# Patient Record
Sex: FEMALE | Race: WHITE | HISPANIC OR LATINO | Employment: PART TIME | URBAN - METROPOLITAN AREA
[De-identification: names, ages, dates, MRNs, and addresses within clinical notes are randomized per-mention and may not be internally consistent; named-entity substitution may affect disease eponyms.]

---
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
## 2020-02-10 ENCOUNTER — TRANSCRIBE ORDERS (OUTPATIENT)
Dept: ADMINISTRATIVE | Facility: HOSPITAL | Age: 20
End: 2020-02-10

## 2020-02-10 ENCOUNTER — APPOINTMENT (OUTPATIENT)
Dept: LAB | Facility: HOSPITAL | Age: 20
End: 2020-02-10
Attending: PLASTIC SURGERY
Payer: COMMERCIAL

## 2020-02-10 DIAGNOSIS — Z01.812 PRE-OPERATIVE LABORATORY EXAMINATION: ICD-10-CM

## 2020-02-10 DIAGNOSIS — Z01.812 PRE-OPERATIVE LABORATORY EXAMINATION: Primary | ICD-10-CM

## 2020-02-10 LAB
ANISOCYTOSIS BLD QL SMEAR: PRESENT
APTT PPP: 28 SECONDS (ref 25–32)
BASOPHILS # BLD AUTO: 0.1 THOUSANDS/ΜL (ref 0–0.1)
BASOPHILS NFR BLD AUTO: 1 % (ref 0–1)
EOSINOPHIL # BLD AUTO: 0.2 THOUSAND/ΜL (ref 0–0.4)
EOSINOPHIL NFR BLD AUTO: 2 % (ref 0–6)
ERYTHROCYTE [DISTWIDTH] IN BLOOD BY AUTOMATED COUNT: 17.5 %
HCT VFR BLD AUTO: 36 % (ref 36–46)
HGB BLD-MCNC: 11.3 G/DL (ref 12–16)
HYPERCHROMIA BLD QL SMEAR: PRESENT
INR PPP: 0.94 (ref 0.91–1.09)
LG PLATELETS BLD QL SMEAR: PRESENT
LYMPHOCYTES # BLD AUTO: 2.4 THOUSANDS/ΜL (ref 0.5–4)
LYMPHOCYTES NFR BLD AUTO: 30 % (ref 25–45)
MCH RBC QN AUTO: 21.6 PG (ref 26–34)
MCHC RBC AUTO-ENTMCNC: 31.5 G/DL (ref 31–36)
MCV RBC AUTO: 69 FL (ref 80–100)
MICROCYTES BLD QL AUTO: PRESENT
MONOCYTES # BLD AUTO: 0.6 THOUSAND/ΜL (ref 0.2–0.9)
MONOCYTES NFR BLD AUTO: 7 % (ref 1–10)
NEUTROPHILS # BLD AUTO: 4.9 THOUSANDS/ΜL (ref 1.8–7.8)
NEUTS SEG NFR BLD AUTO: 61 % (ref 45–65)
PLATELET # BLD AUTO: 286 THOUSANDS/UL (ref 150–450)
PLATELET BLD QL SMEAR: ADEQUATE
PMV BLD AUTO: 8.3 FL (ref 8.9–12.7)
POIKILOCYTOSIS BLD QL SMEAR: PRESENT
PROTHROMBIN TIME: 10.5 SECONDS (ref 9.8–12)
RBC # BLD AUTO: 5.24 MILLION/UL (ref 4–5.2)
RBC MORPH BLD: NORMAL
WBC # BLD AUTO: 8.1 THOUSAND/UL (ref 4.5–11)

## 2020-02-10 PROCEDURE — 85025 COMPLETE CBC W/AUTO DIFF WBC: CPT

## 2020-02-10 PROCEDURE — 85730 THROMBOPLASTIN TIME PARTIAL: CPT

## 2020-02-10 PROCEDURE — 85610 PROTHROMBIN TIME: CPT

## 2020-02-10 PROCEDURE — 36415 COLL VENOUS BLD VENIPUNCTURE: CPT

## 2020-02-20 NOTE — PRE-PROCEDURE INSTRUCTIONS
Pre-Surgery Instructions:   Medication Instructions    cetirizine (ZyrTEC) 10 mg tablet Instructed patient per Anesthesia Guidelines  Pre-op Showering Instructions for Surgery Patients    Before your operation, you play an important role in decreasing your risk for infection by washing with special antiseptic soap  This is an effective way to reduce bacteria on the skin which may help to prevent infections at the surgical site  Please read the following directions in advance  1  In the week before your operation, purchase a 4 ounce bottle of antiseptic soap containing chlorhexidine gluconate (CHG)  4%  Some brand names include: Aplicare®, Endure, and Hibiclens®  The cost is usually less than $5 00   For your convenience, the [employer] carries the soap   It may also be available at your doctors office or pre-admission testing center, and at most retail pharmacies   If you are allergic or sensitive to soaps containing CHG, please let your doctor know so another antiseptic can be suggested   CHG antiseptic soap is for external use only  2  The day before your operation, follow these instructions carefully to get ready   Please clean linens (sheets) on your bed; you should sleep on clean sheets after your evening shower   Get clean towels and washcloth ready - you need enough for 2 showers   Set aside clean underwear, pajamas, and clothing to wear after the showers     Reminders:   DO NOT use any other soap or body rinse on your skin during or after the antiseptic showers   DO NOT use lotion, powder, deodorant, or perfume/aftershave of any kind on your skin after your antiseptic shower   DO NOT shave any body parts in the 24 hours/day before your operation   DO NOT get the antiseptic soap in your eyes, ears, nose, mouth, or vaginal area    3   You will need to shower the night before AND the morning of your surgery  Shower 1:   The first evening before the operation, take the first shower   First, shampoo your hair with regular shampoo and rinse it completely before you use the antiseptic soap  After washing and rinsing your hair, rinse your body   Next, use a clean washcloth to apply the antiseptic soap and wash your body from the neck down to your toes using ½ bottle of the antiseptic soap   You will use the other ½ bottle for the second shower   Clean the area where your incision will be; lather this area well for about 2 minutes   If you are having head or neck surgery, wash areas with the antiseptic soap   Rinse yourself completely with running water   Use a clean towel to dry off   Wear clean underwear and clothing/pajamas  Shower 2   The morning of your operation, take the second shower following the same steps as Shower 1 using the second ½ of the bottle of antiseptic soap   Use clean cloths and towels to wash and dry yourself   Wear clean underwear and clothing

## 2020-02-21 ENCOUNTER — ANESTHESIA EVENT (OUTPATIENT)
Dept: PERIOP | Facility: HOSPITAL | Age: 20
End: 2020-02-21
Payer: COMMERCIAL

## 2020-02-21 NOTE — H&P
H&P Exam - Elio Salomon 21 y o  female MRN: [id number]    Unit/Bed#:  Encounter: [id number]    Assessment:  Bilateral macromastia    Plan:  Bilateral breast reduction mammoplasty    History of Present Illness   This is a 55-year-old female with bilateral large heavy pendulous breasts that hangs down over her abdomen not supported well on the chest wall  Patient has a chronic intertriginous rash in the inframammary fold  Patient has rounding of her shoulders and notching in her shoulders from the heaviness flow bra straps  Patient has a diagnosis of bilateral macromastia  Review of Systems   All other systems reviewed and are negative  Historical Information   Past Medical History:   Diagnosis Date    Allergic rhinitis     Anemia     Anxiety     Depression     no meds    Medical history non-contributory     Migraines      Past Surgical History:   Procedure Laterality Date    NO PAST SURGERIES       Social History   Social History     Substance and Sexual Activity   Alcohol Use No     Social History     Substance and Sexual Activity   Drug Use No     Social History     Tobacco Use   Smoking Status Never Smoker   Smokeless Tobacco Never Used     Family History: non-contributory    Meds/Allergies   all medications and allergies reviewed  Allergies   Allergen Reactions    Pollen Extract        Objective   First Vitals:        Current Vitals:        No intake or output data in the 24 hours ending 02/21/20 1731    Invasive Devices     None                 Physical Exam examination of the head ears eyes nose and throat is within normal limits heart sounds S1-S2 heard no murmurs or gallops lungs clear abdomen soft extremities are within normal limits the breast of bilateral large heavy and pendulous  Hang down over the abdomen  There is a chronic intertriginous rash in the inframammary fold  The right breast is bigger than the left      Lab Results:   Imaging:   EKG, Pathology, and Other Studies:     Code Status: No Order  Advance Directive and Living Will:      Power of :    POLST:      Counseling / Coordination of Care:   None

## 2020-02-21 NOTE — ANESTHESIA PREPROCEDURE EVALUATION
Review of Systems/Medical History  Patient summary reviewed  Chart reviewed  No history of anesthetic complications     Cardiovascular  Exercise tolerance (METS): >4,     Pulmonary  Not a smoker , No asthma , No sleep apnea ,        GI/Hepatic  Negative GI/hepatic ROS          Negative  ROS        Endo/Other    Obesity    GYN  Not currently pregnant , Prior pregnancy/OB history : 0 Parity: 0,          Hematology  Anemia iron deficiency anemia,     Musculoskeletal  Negative musculoskeletal ROS Back pain , lumbar pain,        Neurology    Headaches,    Psychology   Anxiety, Depression , being treated for depression,              Physical Exam    Airway    Mallampati score: II  TM Distance: >3 FB  Neck ROM: full     Dental       Cardiovascular  Cardiovascular exam normal    Pulmonary  Pulmonary exam normal     Other Findings  Fixed upper and lower teeth and in good repair      Anesthesia Plan  ASA Score- 2     Anesthesia Type- general with ASA Monitors  Additional Monitors:   Airway Plan: ETT  Plan Factors-Patient not instructed to abstain from smoking on day of procedure  Patient did not smoke on day of surgery  Induction- intravenous  Postoperative Plan- Plan for postoperative opioid use  Informed Consent- Anesthetic plan and risks discussed with patient, mother and sibling  I personally reviewed this patient with the CRNA  Discussed and agreed on the Anesthesia Plan with the CRNA  Greg Yen

## 2020-02-24 ENCOUNTER — ANESTHESIA (OUTPATIENT)
Dept: PERIOP | Facility: HOSPITAL | Age: 20
End: 2020-02-24
Payer: COMMERCIAL

## 2020-02-24 ENCOUNTER — HOSPITAL ENCOUNTER (OUTPATIENT)
Facility: HOSPITAL | Age: 20
Setting detail: OUTPATIENT SURGERY
Discharge: HOME | End: 2020-02-25
Attending: PLASTIC SURGERY | Admitting: PLASTIC SURGERY
Payer: COMMERCIAL

## 2020-02-24 DIAGNOSIS — N62 HYPERTROPHY OF BREAST: Primary | ICD-10-CM

## 2020-02-24 LAB
EXT PREGNANCY TEST URINE: NEGATIVE
EXT. CONTROL: NORMAL

## 2020-02-24 PROCEDURE — 88305 TISSUE EXAM BY PATHOLOGIST: CPT | Performed by: PATHOLOGY

## 2020-02-24 PROCEDURE — 81025 URINE PREGNANCY TEST: CPT | Performed by: ANESTHESIOLOGY

## 2020-02-24 RX ORDER — TRAMADOL HYDROCHLORIDE 50 MG/1
50 TABLET ORAL EVERY 6 HOURS PRN
Status: DISCONTINUED | OUTPATIENT
Start: 2020-02-24 | End: 2020-02-25 | Stop reason: HOSPADM

## 2020-02-24 RX ORDER — PROMETHAZINE HYDROCHLORIDE 25 MG/ML
12.5 INJECTION, SOLUTION INTRAMUSCULAR; INTRAVENOUS ONCE AS NEEDED
Status: COMPLETED | OUTPATIENT
Start: 2020-02-24 | End: 2020-02-24

## 2020-02-24 RX ORDER — ROCURONIUM BROMIDE 10 MG/ML
INJECTION, SOLUTION INTRAVENOUS AS NEEDED
Status: DISCONTINUED | OUTPATIENT
Start: 2020-02-24 | End: 2020-02-24 | Stop reason: SURG

## 2020-02-24 RX ORDER — NEOSTIGMINE METHYLSULFATE 1 MG/ML
INJECTION INTRAVENOUS AS NEEDED
Status: DISCONTINUED | OUTPATIENT
Start: 2020-02-24 | End: 2020-02-24 | Stop reason: SURG

## 2020-02-24 RX ORDER — CEFAZOLIN SODIUM 2 G/50ML
2000 SOLUTION INTRAVENOUS ONCE
Status: COMPLETED | OUTPATIENT
Start: 2020-02-24 | End: 2020-02-24

## 2020-02-24 RX ORDER — SCOLOPAMINE TRANSDERMAL SYSTEM 1 MG/1
1 PATCH, EXTENDED RELEASE TRANSDERMAL
Status: DISCONTINUED | OUTPATIENT
Start: 2020-02-24 | End: 2020-02-25 | Stop reason: HOSPADM

## 2020-02-24 RX ORDER — HYDROMORPHONE HCL 110MG/55ML
1 PATIENT CONTROLLED ANALGESIA SYRINGE INTRAVENOUS EVERY 2 HOUR PRN
Status: DISCONTINUED | OUTPATIENT
Start: 2020-02-24 | End: 2020-02-25 | Stop reason: HOSPADM

## 2020-02-24 RX ORDER — DIPHENHYDRAMINE HYDROCHLORIDE 50 MG/ML
12.5 INJECTION INTRAMUSCULAR; INTRAVENOUS ONCE AS NEEDED
Status: DISCONTINUED | OUTPATIENT
Start: 2020-02-24 | End: 2020-02-24 | Stop reason: HOSPADM

## 2020-02-24 RX ORDER — HYDROMORPHONE HCL/PF 1 MG/ML
0.5 SYRINGE (ML) INJECTION
Status: COMPLETED | OUTPATIENT
Start: 2020-02-24 | End: 2020-02-24

## 2020-02-24 RX ORDER — SODIUM CHLORIDE, SODIUM LACTATE, POTASSIUM CHLORIDE, CALCIUM CHLORIDE 600; 310; 30; 20 MG/100ML; MG/100ML; MG/100ML; MG/100ML
75 INJECTION, SOLUTION INTRAVENOUS CONTINUOUS
Status: DISCONTINUED | OUTPATIENT
Start: 2020-02-24 | End: 2020-02-25 | Stop reason: HOSPADM

## 2020-02-24 RX ORDER — ONDANSETRON 2 MG/ML
4 INJECTION INTRAMUSCULAR; INTRAVENOUS EVERY 6 HOURS PRN
Status: DISCONTINUED | OUTPATIENT
Start: 2020-02-24 | End: 2020-02-25 | Stop reason: HOSPADM

## 2020-02-24 RX ORDER — MEPERIDINE HYDROCHLORIDE 25 MG/ML
12.5 INJECTION INTRAMUSCULAR; INTRAVENOUS; SUBCUTANEOUS
Status: DISCONTINUED | OUTPATIENT
Start: 2020-02-24 | End: 2020-02-24 | Stop reason: HOSPADM

## 2020-02-24 RX ORDER — DIPHENHYDRAMINE HYDROCHLORIDE 50 MG/ML
25 INJECTION INTRAMUSCULAR; INTRAVENOUS EVERY 6 HOURS PRN
Status: DISCONTINUED | OUTPATIENT
Start: 2020-02-24 | End: 2020-02-25 | Stop reason: HOSPADM

## 2020-02-24 RX ORDER — HYDROMORPHONE HCL/PF 1 MG/ML
SYRINGE (ML) INJECTION AS NEEDED
Status: DISCONTINUED | OUTPATIENT
Start: 2020-02-24 | End: 2020-02-24 | Stop reason: SURG

## 2020-02-24 RX ORDER — MIDAZOLAM HYDROCHLORIDE 2 MG/2ML
INJECTION, SOLUTION INTRAMUSCULAR; INTRAVENOUS AS NEEDED
Status: DISCONTINUED | OUTPATIENT
Start: 2020-02-24 | End: 2020-02-24 | Stop reason: SURG

## 2020-02-24 RX ORDER — MAGNESIUM HYDROXIDE 1200 MG/15ML
LIQUID ORAL AS NEEDED
Status: DISCONTINUED | OUTPATIENT
Start: 2020-02-24 | End: 2020-02-24 | Stop reason: HOSPADM

## 2020-02-24 RX ORDER — GLYCOPYRROLATE 0.2 MG/ML
INJECTION INTRAMUSCULAR; INTRAVENOUS AS NEEDED
Status: DISCONTINUED | OUTPATIENT
Start: 2020-02-24 | End: 2020-02-24 | Stop reason: SURG

## 2020-02-24 RX ORDER — HYDROMORPHONE HCL/PF 1 MG/ML
0.2 SYRINGE (ML) INJECTION
Status: ACTIVE | OUTPATIENT
Start: 2020-02-24 | End: 2020-02-24

## 2020-02-24 RX ORDER — LIDOCAINE HYDROCHLORIDE AND EPINEPHRINE 5; 5 MG/ML; UG/ML
INJECTION, SOLUTION INFILTRATION; PERINEURAL AS NEEDED
Status: DISCONTINUED | OUTPATIENT
Start: 2020-02-24 | End: 2020-02-24 | Stop reason: HOSPADM

## 2020-02-24 RX ORDER — DEXAMETHASONE SODIUM PHOSPHATE 4 MG/ML
4 INJECTION, SOLUTION INTRA-ARTICULAR; INTRALESIONAL; INTRAMUSCULAR; INTRAVENOUS; SOFT TISSUE ONCE AS NEEDED
Status: DISCONTINUED | OUTPATIENT
Start: 2020-02-24 | End: 2020-02-24 | Stop reason: HOSPADM

## 2020-02-24 RX ORDER — FENTANYL CITRATE 50 UG/ML
INJECTION, SOLUTION INTRAMUSCULAR; INTRAVENOUS AS NEEDED
Status: DISCONTINUED | OUTPATIENT
Start: 2020-02-24 | End: 2020-02-24 | Stop reason: SURG

## 2020-02-24 RX ORDER — DEXTROSE MONOHYDRATE AND SODIUM CHLORIDE 5; .45 G/100ML; G/100ML
125 INJECTION, SOLUTION INTRAVENOUS CONTINUOUS
Status: DISCONTINUED | OUTPATIENT
Start: 2020-02-24 | End: 2020-02-25 | Stop reason: HOSPADM

## 2020-02-24 RX ORDER — ONDANSETRON 2 MG/ML
INJECTION INTRAMUSCULAR; INTRAVENOUS AS NEEDED
Status: DISCONTINUED | OUTPATIENT
Start: 2020-02-24 | End: 2020-02-24 | Stop reason: SURG

## 2020-02-24 RX ORDER — CEFAZOLIN SODIUM 1 G/50ML
1000 SOLUTION INTRAVENOUS EVERY 8 HOURS
Status: COMPLETED | OUTPATIENT
Start: 2020-02-24 | End: 2020-02-25

## 2020-02-24 RX ORDER — LIDOCAINE HYDROCHLORIDE 10 MG/ML
INJECTION, SOLUTION EPIDURAL; INFILTRATION; INTRACAUDAL; PERINEURAL AS NEEDED
Status: DISCONTINUED | OUTPATIENT
Start: 2020-02-24 | End: 2020-02-24 | Stop reason: SURG

## 2020-02-24 RX ORDER — PROPOFOL 10 MG/ML
INJECTION, EMULSION INTRAVENOUS AS NEEDED
Status: DISCONTINUED | OUTPATIENT
Start: 2020-02-24 | End: 2020-02-24 | Stop reason: SURG

## 2020-02-24 RX ORDER — DEXAMETHASONE SODIUM PHOSPHATE 10 MG/ML
INJECTION, SOLUTION INTRAMUSCULAR; INTRAVENOUS AS NEEDED
Status: DISCONTINUED | OUTPATIENT
Start: 2020-02-24 | End: 2020-02-24 | Stop reason: SURG

## 2020-02-24 RX ADMIN — DEXTROSE AND SODIUM CHLORIDE 125 ML/HR: 5; .45 INJECTION, SOLUTION INTRAVENOUS at 23:20

## 2020-02-24 RX ADMIN — ONDANSETRON HYDROCHLORIDE 4 MG: 2 INJECTION, SOLUTION INTRAMUSCULAR; INTRAVENOUS at 12:16

## 2020-02-24 RX ADMIN — SCOPALAMINE 1 PATCH: 1 PATCH, EXTENDED RELEASE TRANSDERMAL at 15:44

## 2020-02-24 RX ADMIN — CEFAZOLIN SODIUM 1000 MG: 1 SOLUTION INTRAVENOUS at 17:36

## 2020-02-24 RX ADMIN — HYDROMORPHONE HYDROCHLORIDE: 2 INJECTION, SOLUTION INTRAMUSCULAR; INTRAVENOUS; SUBCUTANEOUS at 17:36

## 2020-02-24 RX ADMIN — GLYCOPYRROLATE 0.4 MG: 0.2 INJECTION, SOLUTION INTRAMUSCULAR; INTRAVENOUS at 12:17

## 2020-02-24 RX ADMIN — FENTANYL CITRATE 100 MCG: 50 INJECTION INTRAMUSCULAR; INTRAVENOUS at 09:41

## 2020-02-24 RX ADMIN — FENTANYL CITRATE 50 MCG: 50 INJECTION INTRAMUSCULAR; INTRAVENOUS at 08:50

## 2020-02-24 RX ADMIN — HYDROMORPHONE HYDROCHLORIDE 0.2 MG: 1 INJECTION, SOLUTION INTRAMUSCULAR; INTRAVENOUS; SUBCUTANEOUS at 13:16

## 2020-02-24 RX ADMIN — MIDAZOLAM HYDROCHLORIDE 2 MG: 1 INJECTION, SOLUTION INTRAMUSCULAR; INTRAVENOUS at 08:50

## 2020-02-24 RX ADMIN — SODIUM CHLORIDE, SODIUM LACTATE, POTASSIUM CHLORIDE, AND CALCIUM CHLORIDE: .6; .31; .03; .02 INJECTION, SOLUTION INTRAVENOUS at 08:31

## 2020-02-24 RX ADMIN — HYDROMORPHONE HYDROCHLORIDE 0.2 MG: 1 INJECTION, SOLUTION INTRAMUSCULAR; INTRAVENOUS; SUBCUTANEOUS at 13:36

## 2020-02-24 RX ADMIN — CEFAZOLIN SODIUM 2000 MG: 2 SOLUTION INTRAVENOUS at 09:01

## 2020-02-24 RX ADMIN — HYDROMORPHONE HYDROCHLORIDE 0.2 MG: 1 INJECTION, SOLUTION INTRAMUSCULAR; INTRAVENOUS; SUBCUTANEOUS at 13:26

## 2020-02-24 RX ADMIN — HYDROMORPHONE HYDROCHLORIDE 0.5 MG: 1 INJECTION, SOLUTION INTRAMUSCULAR; INTRAVENOUS; SUBCUTANEOUS at 11:33

## 2020-02-24 RX ADMIN — HYDROMORPHONE HYDROCHLORIDE 1 MG: 2 INJECTION, SOLUTION INTRAMUSCULAR; INTRAVENOUS; SUBCUTANEOUS at 20:50

## 2020-02-24 RX ADMIN — HYDROMORPHONE HYDROCHLORIDE 0.5 MG: 1 INJECTION, SOLUTION INTRAMUSCULAR; INTRAVENOUS; SUBCUTANEOUS at 12:51

## 2020-02-24 RX ADMIN — ROCURONIUM BROMIDE 20 MG: 10 INJECTION, SOLUTION INTRAVENOUS at 09:48

## 2020-02-24 RX ADMIN — SODIUM CHLORIDE, SODIUM LACTATE, POTASSIUM CHLORIDE, AND CALCIUM CHLORIDE: .6; .31; .03; .02 INJECTION, SOLUTION INTRAVENOUS at 12:13

## 2020-02-24 RX ADMIN — PROPOFOL 200 MG: 10 INJECTION, EMULSION INTRAVENOUS at 08:55

## 2020-02-24 RX ADMIN — NEOSTIGMINE METHYLSULFATE 3 MG: 1 INJECTION INTRAVENOUS at 12:17

## 2020-02-24 RX ADMIN — PROMETHAZINE HYDROCHLORIDE 12.5 MG: 25 INJECTION INTRAMUSCULAR; INTRAVENOUS at 15:15

## 2020-02-24 RX ADMIN — DEXTROSE AND SODIUM CHLORIDE 125 ML/HR: 5; .45 INJECTION, SOLUTION INTRAVENOUS at 15:19

## 2020-02-24 RX ADMIN — SODIUM CHLORIDE, SODIUM LACTATE, POTASSIUM CHLORIDE, AND CALCIUM CHLORIDE: .6; .31; .03; .02 INJECTION, SOLUTION INTRAVENOUS at 09:33

## 2020-02-24 RX ADMIN — DEXAMETHASONE SODIUM PHOSPHATE 8 MG: 10 INJECTION, SOLUTION INTRAMUSCULAR; INTRAVENOUS at 09:02

## 2020-02-24 RX ADMIN — HYDROMORPHONE HYDROCHLORIDE 0.5 MG: 1 INJECTION, SOLUTION INTRAMUSCULAR; INTRAVENOUS; SUBCUTANEOUS at 12:36

## 2020-02-24 RX ADMIN — HYDROMORPHONE HYDROCHLORIDE 0.2 MG: 1 INJECTION, SOLUTION INTRAMUSCULAR; INTRAVENOUS; SUBCUTANEOUS at 12:56

## 2020-02-24 RX ADMIN — HYDROMORPHONE HYDROCHLORIDE 0.2 MG: 1 INJECTION, SOLUTION INTRAMUSCULAR; INTRAVENOUS; SUBCUTANEOUS at 13:06

## 2020-02-24 RX ADMIN — HYDROMORPHONE HYDROCHLORIDE 0.5 MG: 1 INJECTION, SOLUTION INTRAMUSCULAR; INTRAVENOUS; SUBCUTANEOUS at 12:41

## 2020-02-24 RX ADMIN — HYDROMORPHONE HYDROCHLORIDE 0.5 MG: 1 INJECTION, SOLUTION INTRAMUSCULAR; INTRAVENOUS; SUBCUTANEOUS at 11:47

## 2020-02-24 RX ADMIN — ROCURONIUM BROMIDE 50 MG: 10 INJECTION, SOLUTION INTRAVENOUS at 08:56

## 2020-02-24 RX ADMIN — LIDOCAINE HYDROCHLORIDE 50 MG: 10 INJECTION, SOLUTION EPIDURAL; INFILTRATION; INTRACAUDAL; PERINEURAL at 08:55

## 2020-02-24 RX ADMIN — HYDROMORPHONE HYDROCHLORIDE 0.5 MG: 1 INJECTION, SOLUTION INTRAMUSCULAR; INTRAVENOUS; SUBCUTANEOUS at 12:46

## 2020-02-24 RX ADMIN — FENTANYL CITRATE 50 MCG: 50 INJECTION INTRAMUSCULAR; INTRAVENOUS at 09:24

## 2020-02-24 NOTE — INTERVAL H&P NOTE
H&P reviewed  After examining the patient I find no changes in the patients condition since the H&P had been written      Vitals:    02/24/20 0649   BP: 120/65   Pulse: 64   Resp: 20   Temp: 97 7 °F (36 5 °C)   SpO2: 100%

## 2020-02-24 NOTE — ANESTHESIA POSTPROCEDURE EVALUATION
Post-Op Assessment Note    CV Status:  Stable  Pain Score: 3    Pain management: adequate     Mental Status:  Alert and awake   Hydration Status:  Euvolemic   PONV Controlled:  Controlled   Airway Patency:  Patent  Airway: intubated   Post Op Vitals Reviewed: Yes      Staff: Anesthesiologist, CRNA   Comments: no post op airway issues noted          /85 (02/24/20 1228)    Temp (!) 97 3 °F (36 3 °C) (02/24/20 1228)    Pulse 71 (02/24/20 1228)   Resp 16 (02/24/20 1228)    SpO2 100 % (02/24/20 1228)

## 2020-02-24 NOTE — ANESTHESIA POSTPROCEDURE EVALUATION
Post-Op Assessment Note    CV Status:  Stable  Pain Score: 0    Pain management: adequate     Mental Status:  Alert and awake   Hydration Status:  Euvolemic   PONV Controlled:  Controlled   Airway Patency:  Patent   Post Op Vitals Reviewed: Yes      Staff: CRNA           /85 (02/24/20 1228)    Temp (!) 97 3 °F (36 3 °C) (02/24/20 1228)    Pulse 71 (02/24/20 1228)   Resp 16 (02/24/20 1228)    SpO2 100 % (02/24/20 1228)

## 2020-02-24 NOTE — OP NOTE
OPERATIVE REPORT  PATIENT NAME: Lakhwinder Glynn    :  2000  MRN: [id number]  Pt Location:  OR ROOM 07    SURGERY DATE: 2020    Surgeon(s) and Role:     * Francois Enamorado MD - Primary    Preop Diagnosis:  Hypertrophy of breast [N62]    Post-Op Diagnosis Codes:     * Hypertrophy of breast [N62]    Procedure(s) (LRB):  BREAST REDUCTION (Bilateral)    Specimen(s):  ID Type Source Tests Collected by Time Destination   1 : REDUCTION TISSUE, RIGHT Tissue Breast, Right TISSUE EXAM Francois Enamorado MD 2020 1005    2 : REDUCTION TISSUE, LEFT Tissue Breast, Left TISSUE EXAM Francois Enamorado MD 2020 1024        Estimated Blood Loss:   100 mL    Drains:  Closed/Suction Drain Right Breast Bulb 19 Fr  (Active)   Number of days: 0       Closed/Suction Drain Left Breast Bulb 19 Fr  (Active)   Number of days: 0       Anesthesia Type:   General    Operative Indications:  Hypertrophy of breast [N62]  Chronic intertriginous rash upper back and neck strain    Operative Findings:  Normal    Complications:   None    Procedure and Technique:  Patient was marked in the holding area for an inferior pedicle keyhole pattern reduction mammoplasty  She was draped and prepped in normal sterile fashion after general endotracheal anesthesia  First the nipple-areolar complex was cut out with a 38 mm cookie cutter and 8 cm wide inferior pedicle was de-epithelialized  The excessive medial middle and lateral breast tissue was excised hemostasis being achieved electrocautery and suture ligation  Wounds were irrigated  Dermis was approximated with 2-0 Vicryl buried sutures the incision closed with running subcuticular 3-0 Prolene suture and the nipple was closed with running subcuticular 4-0 Vicryl suture prior to closure a 19  Channel drain was placed through separate stab wound in the flank  Identical procedure was performed on both sides     I was present for the entire procedure    Patient Disposition:  PACU     SIGNATURE: Jesus Hernandez MD  DATE: February 24, 2020  TIME: 12:11 PM

## 2020-02-24 NOTE — NURSING NOTE
Post Op Admission Note  Patient arrived on unit drowsy bur easily arousalble  Oriented to room with call bell and phone within reach, will orient again  States pain at level 3  Surgical bra in place no drainage noted  Ambulated to the bathroom and voided  All safety maintained  Will continue to monitor

## 2020-02-24 NOTE — ANESTHESIA POSTPROCEDURE EVALUATION
Post-Op Assessment Note    CV Status:  Stable  Pain Score: 2    Pain management: adequate     Mental Status:  Alert and awake   Hydration Status:  Euvolemic   PONV Controlled:  Controlled   Airway Patency:  Patent   Post Op Vitals Reviewed: Yes      Staff: CRNA           /85 (02/24/20 1228)    Temp (!) 97 3 °F (36 3 °C) (02/24/20 1228)    Pulse 71 (02/24/20 1228)   Resp 16 (02/24/20 1228)    SpO2 100 % (02/24/20 1228)

## 2020-02-25 VITALS
DIASTOLIC BLOOD PRESSURE: 57 MMHG | TEMPERATURE: 98.7 F | HEART RATE: 86 BPM | RESPIRATION RATE: 18 BRPM | OXYGEN SATURATION: 97 % | SYSTOLIC BLOOD PRESSURE: 105 MMHG

## 2020-02-25 RX ORDER — CEPHALEXIN 500 MG/1
500 CAPSULE ORAL EVERY 8 HOURS SCHEDULED
Qty: 21 CAPSULE | Refills: 0 | Status: SHIPPED | OUTPATIENT
Start: 2020-02-25 | End: 2020-02-28 | Stop reason: ALTCHOICE

## 2020-02-25 RX ORDER — TRAMADOL HYDROCHLORIDE 50 MG/1
50 TABLET ORAL EVERY 6 HOURS PRN
Qty: 30 TABLET | Refills: 0 | Status: SHIPPED | OUTPATIENT
Start: 2020-02-25 | End: 2020-03-06

## 2020-02-25 RX ADMIN — CEFAZOLIN SODIUM 1000 MG: 1 SOLUTION INTRAVENOUS at 00:00

## 2020-02-25 RX ADMIN — TRAMADOL HYDROCHLORIDE 50 MG: 50 TABLET, FILM COATED ORAL at 04:23

## 2020-02-25 RX ADMIN — TRAMADOL HYDROCHLORIDE 50 MG: 50 TABLET, FILM COATED ORAL at 10:41

## 2020-02-25 NOTE — NURSING NOTE
Discharge Note  Patient leaves for home in stable condition, afebrile with all personal items after patient and mother verbalized understanding of discharge instructions  Left via w/c with volunteer

## 2020-02-25 NOTE — NURSING NOTE
Assessment unchanged  C/O pain Tramadol was given and was effective  Will continue to monitor call bell within reach

## 2020-02-25 NOTE — NURSING NOTE
On initial rounds patient in no apparent distress  Skin warm and dry to touch  Pleasant and cooperative  Encourage out of bed to ambulate  Ambulates well by self  Tolerating diet  Voids adequate amount of urine  Surgical bra in place no drainage noted  All safety maintained  Will continue to monitor

## 2020-02-25 NOTE — NURSING NOTE
AOX3 HR regular Lungs clear + Bowel sounds x4 + PP ABD soft  C/O pain Dilaudid was given and was effective  Surgical bra in place dressing dry intact  Will continue to monitor call bell within reach

## 2020-02-25 NOTE — PLAN OF CARE
Problem: PAIN - ADULT  Goal: Verbalizes/displays adequate comfort level or baseline comfort level  Description  Interventions:  - Encourage patient to monitor pain and request assistance  - Assess pain using appropriate pain scale  - Administer analgesics based on type and severity of pain and evaluate response  - Implement non-pharmacological measures as appropriate and evaluate response  - Consider cultural and social influences on pain and pain management  - Notify physician/advanced practitioner if interventions unsuccessful or patient reports new pain  Outcome: Progressing     Problem: INFECTION - ADULT  Goal: Absence or prevention of progression during hospitalization  Description  INTERVENTIONS:  - Assess and monitor for signs and symptoms of infection  - Monitor lab/diagnostic results  - Monitor all insertion sites, i e  indwelling lines, tubes, and drains  - Monitor endotracheal if appropriate and nasal secretions for changes in amount and color  - [place] appropriate cooling/warming therapies per order  - Administer medications as ordered  - Instruct and encourage patient and family to use good hand hygiene technique  - Identify and instruct in appropriate isolation precautions for identified infection/condition  Outcome: Progressing  Goal: Absence of fever/infection during neutropenic period  Description  INTERVENTIONS:  - Monitor WBC    Outcome: Progressing     Problem: SAFETY ADULT  Goal: Patient will remain free of falls  Description  INTERVENTIONS:  - Assess patient frequently for physical needs  -  Identify cognitive and physical deficits and behaviors that affect risk of falls    -  [place] fall precautions as indicated by assessment   - Educate patient/family on patient safety including physical limitations  - Instruct patient to call for assistance with activity based on assessment  - Modify environment to reduce risk of injury  - Consider OT/PT consult to assist with strengthening/mobility  Outcome: Progressing  Goal: Maintain or return to baseline ADL function  Description  INTERVENTIONS:  -  Assess patient's ability to carry out ADLs; assess patient's baseline for ADL function and identify physical deficits which impact ability to perform ADLs (bathing, care of mouth/teeth, toileting, grooming, dressing, etc )  - Assess/evaluate cause of self-care deficits   - Assess range of motion  - Assess patient's mobility; develop plan if impaired  - Assess patient's need for assistive devices and provide as appropriate  - Encourage maximum independence but intervene and supervise when necessary  - Involve family in performance of ADLs  - Assess for home care needs following discharge   - Consider OT consult to assist with ADL evaluation and planning for discharge  - Provide patient education as appropriate  Outcome: Progressing  Goal: Maintain or return mobility status to optimal level  Description  INTERVENTIONS:  - Assess patient's baseline mobility status (ambulation, transfers, stairs, etc )    - Identify cognitive and physical deficits and behaviors that affect mobility  - Identify mobility aids required to assist with transfers and/or ambulation (gait belt, sit-to-stand, lift, walker, cane, etc )  - [place] fall precautions as indicated by assessment  - Record patient progress and toleration of activity level on Mobility SBAR; progress patient to next Phase/Stage  - Instruct patient to call for assistance with activity based on assessment  - Consider rehabilitation consult to assist with strengthening/weightbearing, etc   Outcome: Progressing     Problem: DISCHARGE PLANNING  Goal: Discharge to home or other facility with appropriate resources  Description  INTERVENTIONS:  - Identify barriers to discharge w/patient and caregiver  - Arrange for needed discharge resources and transportation as appropriate  - Identify discharge learning needs (meds, wound care, etc )  - Arrange for interpretive services to assist at discharge as needed  - Refer to Case Management Department for coordinating discharge planning if the patient needs post-hospital services based on physician/advanced practitioner order or complex needs related to functional status, cognitive ability, or social support system  Outcome: Progressing     Problem: Knowledge Deficit  Goal: Patient/family/caregiver demonstrates understanding of disease process, treatment plan, medications, and discharge instructions  Description  Complete learning assessment and assess knowledge base  Interventions:  - Provide teaching at level of understanding  - Provide teaching via preferred learning methods  Outcome: Progressing     Problem: Potential for Falls  Goal: Patient will remain free of falls  Description  INTERVENTIONS:  - Assess patient frequently for physical needs  -  Identify cognitive and physical deficits and behaviors that affect risk of falls    -  [place] fall precautions as indicated by assessment   - Educate patient/family on patient safety including physical limitations  - Instruct patient to call for assistance with activity based on assessment  - Modify environment to reduce risk of injury  - Consider OT/PT consult to assist with strengthening/mobility  Outcome: Progressing

## 2020-02-28 ENCOUNTER — OFFICE VISIT (OUTPATIENT)
Dept: FAMILY MEDICINE CLINIC | Facility: CLINIC | Age: 20
End: 2020-02-28

## 2020-02-28 VITALS
TEMPERATURE: 99.7 F | WEIGHT: 173 LBS | HEIGHT: 61 IN | HEART RATE: 110 BPM | OXYGEN SATURATION: 98 % | DIASTOLIC BLOOD PRESSURE: 80 MMHG | SYSTOLIC BLOOD PRESSURE: 110 MMHG | BODY MASS INDEX: 32.66 KG/M2 | RESPIRATION RATE: 20 BRPM

## 2020-02-28 DIAGNOSIS — J02.9 ACUTE PHARYNGITIS, UNSPECIFIED ETIOLOGY: Primary | ICD-10-CM

## 2020-02-28 DIAGNOSIS — J02.9 SORE THROAT: ICD-10-CM

## 2020-02-28 DIAGNOSIS — R11.2 NON-INTRACTABLE VOMITING WITH NAUSEA, UNSPECIFIED VOMITING TYPE: ICD-10-CM

## 2020-02-28 DIAGNOSIS — R53.83 FATIGUE, UNSPECIFIED TYPE: ICD-10-CM

## 2020-02-28 PROBLEM — Z30.46 ENCOUNTER FOR SURVEILLANCE OF NEXPLANON SUBDERMAL CONTRACEPTIVE: Status: ACTIVE | Noted: 2018-03-07

## 2020-02-28 LAB — S PYO AG THROAT QL: NEGATIVE

## 2020-02-28 PROCEDURE — 87070 CULTURE OTHR SPECIMN AEROBIC: CPT | Performed by: PHYSICIAN ASSISTANT

## 2020-02-28 PROCEDURE — 99213 OFFICE O/P EST LOW 20 MIN: CPT | Performed by: PHYSICIAN ASSISTANT

## 2020-02-28 PROCEDURE — 87147 CULTURE TYPE IMMUNOLOGIC: CPT | Performed by: PHYSICIAN ASSISTANT

## 2020-02-28 PROCEDURE — 3008F BODY MASS INDEX DOCD: CPT | Performed by: PHYSICIAN ASSISTANT

## 2020-02-28 PROCEDURE — T1015 CLINIC SERVICE: HCPCS | Performed by: PHYSICIAN ASSISTANT

## 2020-02-28 PROCEDURE — 87880 STREP A ASSAY W/OPTIC: CPT | Performed by: PHYSICIAN ASSISTANT

## 2020-02-28 PROCEDURE — 1036F TOBACCO NON-USER: CPT | Performed by: PHYSICIAN ASSISTANT

## 2020-02-28 RX ORDER — AMOXICILLIN 500 MG/1
500 CAPSULE ORAL EVERY 12 HOURS SCHEDULED
Qty: 20 CAPSULE | Refills: 0 | Status: SHIPPED | OUTPATIENT
Start: 2020-02-28 | End: 2020-03-09

## 2020-02-28 RX ORDER — ONDANSETRON 4 MG/1
4 TABLET, FILM COATED ORAL EVERY 8 HOURS PRN
Qty: 30 TABLET | Refills: 0 | Status: SHIPPED | OUTPATIENT
Start: 2020-02-28 | End: 2020-05-21 | Stop reason: ALTCHOICE

## 2020-02-28 NOTE — PROGRESS NOTES
Assessment/Plan:    Acute pharyngitis/Fatigue/Nausea and Vomiting  - POCT rapid strep in office was negative  Will send for culture  Will order CBC and Monospot to rule out mononucleosis  Will order CMP to check electrolyte levels  - Due to severity of symptoms, will prescribe amoxicillin 500 mg, to be taken twice daily for 10 days  Advised patient to be sure she takes the entire course of the antibiotics even if she starts to feel better beforehand  Advised to stop taking Keflex  - Advised to rest and to stay well hydrated  Will prescribe Zofran 4 mg, to be taken every 8 hours as needed for nausea and vomiting  Advised to start with eating bland foods and taking small sips of water  Advised to then advance diet as tolerated  - Advised to return to clinic or to go to the ER if symptoms persist, worsen, or new symptoms arise  Diagnoses and all orders for this visit:    Acute pharyngitis, unspecified etiology  -     amoxicillin (AMOXIL) 500 mg capsule; Take 1 capsule (500 mg total) by mouth every 12 (twelve) hours for 10 days    Sore throat  -     POCT rapid strepA  -     Mononucleosis screen; Future  -     CBC and differential; Future  -     Throat culture    Non-intractable vomiting with nausea, unspecified vomiting type  -     ondansetron (ZOFRAN) 4 mg tablet; Take 1 tablet (4 mg total) by mouth every 8 (eight) hours as needed for nausea or vomiting  -     Comprehensive metabolic panel; Future    Fatigue, unspecified type      All of patients questions were answered  Patient understands and agrees with the above plan  Return if symptoms worsen or fail to improve  Zuleyma Montelongo PA-C  02/28/20  [facility] [place]           Subjective:     Patient ID: Sandip Herman  is a 21 y o  female with known PMH of breast hypertrophy s/p bilateral breast reduction who presents today in office for sore throat and fevers x4 days   Patient is accompanied today by her mother     - Patient is a 21 y o  female who presents today for sore throat and fevers x4 days  Patient recently had bilateral breast reduction surgery completed on 02/24/2020 without any complications  Patient was started on Keflex 500 mg, every 8 hours  Patient notes just prior to this appointment, she was at follow-up appointment with her surgeon  Patient notes she was told by her surgeon that he saw something in her throat and that she should be further evaluated by her PCP or she should go to the emergency department  Patient notes she was told that if she was prescribed a different antibiotic, then she should stop taking Keflex  - Today, patient notes the day after her surgery she started to experience sore throat, fevers, chills, fatigue, body aches, vomiting, nausea, headaches, dizziness  Patient notes her fevers have been ranging from  degrees F  Patient has been taking Tylenol which has been decreasing the fever, but then the fever comes back  Patient notes her last dose of Tylenol was a few hours ago  Patient notes she has been drinking some water, but whenever she tries to eat something, she throws it back up  Patient denies any sick contacts  Patient denies any rash, cough, abdominal pain, diarrhea, or difficulty swallowing  The following portions of the patient's history were reviewed and updated as appropriate: allergies, current medications, past family history, past medical history, past social history, past surgical history and problem list         Review of Systems   Constitutional: Positive for activity change, appetite change, chills, fatigue and fever  HENT: Positive for congestion, rhinorrhea and sore throat  Negative for ear pain  Eyes: Negative for pain  Respiratory: Negative for cough, chest tightness and shortness of breath  Cardiovascular: Negative for chest pain and palpitations  Gastrointestinal: Positive for constipation  Negative for abdominal pain, diarrhea, nausea and vomiting  Genitourinary: Negative for difficulty urinating and dysuria  Musculoskeletal: Positive for myalgias  Negative for back pain  Skin: Negative for rash  Neurological: Positive for dizziness and headaches  Psychiatric/Behavioral: Negative for behavioral problems  The patient is not nervous/anxious  Objective:   Vitals:    02/28/20 1456   BP: 110/80   Pulse: (!) 110   Resp: 20   Temp: 99 7 °F (37 6 °C)   TempSrc: Tympanic   SpO2: 98%   Weight: 78 5 kg (173 lb)   Height: 5' 1" (1 549 m)         Physical Exam   Constitutional: She is oriented to person, place, and time  She appears well-developed and well-nourished  She appears lethargic  She appears ill  HENT:   Head: Normocephalic and atraumatic  Right Ear: Tympanic membrane, external ear and ear canal normal    Left Ear: Tympanic membrane, external ear and ear canal normal    Nose: Rhinorrhea present  Right sinus exhibits no maxillary sinus tenderness and no frontal sinus tenderness  Left sinus exhibits no maxillary sinus tenderness and no frontal sinus tenderness  Mouth/Throat: Uvula is midline and mucous membranes are normal  Oropharyngeal exudate (white exudate noted throughout tongue and oropharynx), posterior oropharyngeal edema and posterior oropharyngeal erythema present  Eyes: Pupils are equal, round, and reactive to light  Conjunctivae and EOM are normal    Neck: Normal range of motion  Neck supple  Cardiovascular: Normal rate, regular rhythm, normal heart sounds and intact distal pulses  No murmur heard  Pulmonary/Chest: Effort normal and breath sounds normal  She has no wheezes  Abdominal:   Patient is currently wearing abdominal binder  Patient refused to remove binder for abdominal physical exam    Neurological: She is oriented to person, place, and time  She appears lethargic  Skin: Skin is warm and dry  Psychiatric: She has a normal mood and affect   Her behavior is normal    Nursing note and vitals reviewed

## 2020-03-02 LAB — BACTERIA THROAT CULT: ABNORMAL

## 2020-05-19 ENCOUNTER — TELEPHONE (OUTPATIENT)
Dept: FAMILY MEDICINE CLINIC | Facility: CLINIC | Age: 20
End: 2020-05-19

## 2020-05-21 ENCOUNTER — OFFICE VISIT (OUTPATIENT)
Dept: FAMILY MEDICINE CLINIC | Facility: CLINIC | Age: 20
End: 2020-05-21

## 2020-05-21 VITALS
OXYGEN SATURATION: 98 % | RESPIRATION RATE: 20 BRPM | HEIGHT: 61 IN | TEMPERATURE: 96.9 F | HEART RATE: 73 BPM | WEIGHT: 168.1 LBS | BODY MASS INDEX: 31.74 KG/M2

## 2020-05-21 DIAGNOSIS — L70.9 ACNE, UNSPECIFIED ACNE TYPE: Primary | ICD-10-CM

## 2020-05-21 DIAGNOSIS — L68.0 HIRSUTISM: ICD-10-CM

## 2020-05-21 DIAGNOSIS — Z00.00 HEALTHCARE MAINTENANCE: ICD-10-CM

## 2020-05-21 DIAGNOSIS — L30.1 DYSHIDROTIC ECZEMA: ICD-10-CM

## 2020-05-21 PROCEDURE — 99213 OFFICE O/P EST LOW 20 MIN: CPT | Performed by: PHYSICIAN ASSISTANT

## 2020-05-21 PROCEDURE — 1036F TOBACCO NON-USER: CPT | Performed by: PHYSICIAN ASSISTANT

## 2020-05-21 PROCEDURE — 3008F BODY MASS INDEX DOCD: CPT | Performed by: PHYSICIAN ASSISTANT

## 2020-05-21 PROCEDURE — T1015 CLINIC SERVICE: HCPCS | Performed by: PHYSICIAN ASSISTANT

## 2020-05-21 RX ORDER — TRIAMCINOLONE ACETONIDE 1 MG/G
CREAM TOPICAL 2 TIMES DAILY
Qty: 30 G | Refills: 0 | Status: SHIPPED | OUTPATIENT
Start: 2020-05-21 | End: 2020-07-16 | Stop reason: ALTCHOICE

## 2020-07-09 ENCOUNTER — TELEPHONE (OUTPATIENT)
Dept: OTHER | Facility: OTHER | Age: 20
End: 2020-07-09

## 2020-07-16 ENCOUNTER — OFFICE VISIT (OUTPATIENT)
Dept: FAMILY MEDICINE CLINIC | Facility: CLINIC | Age: 20
End: 2020-07-16

## 2020-07-16 VITALS
OXYGEN SATURATION: 99 % | SYSTOLIC BLOOD PRESSURE: 92 MMHG | RESPIRATION RATE: 15 BRPM | HEART RATE: 60 BPM | BODY MASS INDEX: 31.55 KG/M2 | WEIGHT: 167 LBS | TEMPERATURE: 97.8 F | DIASTOLIC BLOOD PRESSURE: 60 MMHG

## 2020-07-16 DIAGNOSIS — R21 RASH OF HANDS: Primary | ICD-10-CM

## 2020-07-16 PROCEDURE — 1036F TOBACCO NON-USER: CPT | Performed by: PHYSICIAN ASSISTANT

## 2020-07-16 PROCEDURE — 99213 OFFICE O/P EST LOW 20 MIN: CPT | Performed by: PHYSICIAN ASSISTANT

## 2020-07-16 RX ORDER — DESOXIMETASONE 2.5 MG/G
CREAM TOPICAL 2 TIMES DAILY
Qty: 30 G | Refills: 0 | Status: SHIPPED | OUTPATIENT
Start: 2020-07-16

## 2020-07-16 NOTE — PROGRESS NOTES
Assessment/Plan:    Rash of hands  - Symptoms are suggestive of eczema/dyshidrotic eczema  - Triamcinolone 0 1% cream was ineffective  Advised to discontinue  Patient has also tried antifungal cream which was ineffective  - Will prescribe higher potency steroid cream -Topicort 0 25% cream, to be applied twice daily to the affected area  - Advised patient to apply cream to the affected area and then to wear gloves  - Will refer to dermatology per patient's request     Diagnoses and all orders for this visit:    Rash of hands  -     desoximetasone (TOPICORT) 0 25 % cream; Apply topically 2 (two) times a day  -     Ambulatory referral to Dermatology; Future      All of patients questions were answered  Patient understands and agrees with the above plan  Return if symptoms worsen or fail to improve  Amadou Mccullough PA-C  07/16/20  [facility] [place]           Subjective:     Patient ID: Jc Dillon  is a 21 y o  female with known PMH of   Breast hypertrophy status post bilateral breast reduction who presents today in office for  Rash on hand  - Patient is a 21 y o  female who presents today for  Rash on hand  Patient was last seen in office on 05/21/2020 due to rash of her right pinky  Patient notes the rash has now spread to her right ring finger and right middle finger  Patient notes the rash is itchy, but denies any pain or burning sensation  Patient notes she has noticed small blisters on her fingers  Patient notes when they popped, water came out  Patient notes she has now stopped washing dishes for the time being  However, patient notes she does continue to clean  Patient was prescribed triamcinolone cream at last office visit  Patient notes the cream has not helped  Patient notes she then tried an antifungal cream which was not effective either  Patient notes no one else in her household has similar symptoms  Patient denies any rash of her left hand    Patient notes since the rash is becoming worse, she would like to be seen by dermatology  The following portions of the patient's history were reviewed and updated as appropriate: allergies, current medications, past family history, past medical history, past social history, past surgical history and problem list         Review of Systems   Constitutional: Negative for appetite change, fatigue and fever  HENT: Negative for congestion, ear pain, rhinorrhea and sore throat  Eyes: Negative for pain and visual disturbance  Respiratory: Negative for cough, chest tightness and shortness of breath  Cardiovascular: Negative for chest pain and palpitations  Gastrointestinal: Negative for abdominal pain, constipation, diarrhea, nausea and vomiting  Genitourinary: Negative for difficulty urinating and dysuria  Musculoskeletal: Negative for arthralgias  Skin: Positive for rash (right pinky, ring, and middle finger)  Neurological: Negative for dizziness and headaches  Psychiatric/Behavioral: The patient is not nervous/anxious  Objective:   Vitals:    07/16/20 0917   BP: 92/60   BP Location: Left arm   Patient Position: Sitting   Cuff Size: Adult   Pulse: 60   Resp: 15   Temp: 97 8 °F (36 6 °C)   TempSrc: Temporal   SpO2: 99%   Weight: 75 8 kg (167 lb)         Physical Exam   Constitutional: She is oriented to person, place, and time  She appears well-developed and well-nourished  No distress  HENT:   Head: Normocephalic and atraumatic  Right Ear: External ear normal    Left Ear: External ear normal    Nose: Nose normal    Mouth/Throat: Uvula is midline, oropharynx is clear and moist and mucous membranes are normal    Eyes: Conjunctivae are normal    Neck: Normal range of motion  Neck supple  Cardiovascular: Normal rate, regular rhythm, normal heart sounds and intact distal pulses  No murmur heard  Pulmonary/Chest: Effort normal and breath sounds normal  She has no wheezes     Neurological: She is alert and oriented to person, place, and time  Skin: Skin is warm and dry  Rash (Erythematous scaly patches noted of right little finger, ring finger, and middle finger ) noted  Psychiatric: She has a normal mood and affect  Her speech is normal and behavior is normal    Nursing note and vitals reviewed

## 2020-07-16 NOTE — PATIENT INSTRUCTIONS
Dyshidrotic Eczema   WHAT YOU NEED TO KNOW:   Dyshidrotic eczema is a recurrent skin rash with small fluid-filled blisters  The blisters appear on palms of your hands, on the sides of your fingers, and soles of your feet  The exact cause is unknown  Your risk may be increased if you have allergies, you smoke, or have other skin conditions, such as atopic dermatitis  Your risk may also increase if you eat a diet high in metal salts  Foods such as mushrooms, chocolate and coffee contain metal salts  DISCHARGE INSTRUCTIONS:   Contact your healthcare provider if:   · The area of your rash is swollen, painful, draining fluid, and feels hot  · The blisters have yellow crust on them  · You have questions or concerns about your condition or care  Medicines:   · Medicines  help decrease itching and how long you have a rash  This medicine may be a pill or cream  You may also need medicine to treat an infection  · Take your medicine as directed  Contact your healthcare provider if you think your medicine is not helping or if you have side effects  Tell him of her if you are allergic to any medicine  Keep a list of the medicines, vitamins, and herbs you take  Include the amounts, and when and why you take them  Bring the list or the pill bottles to follow-up visits  Carry your medicine list with you in case of an emergency  Care for your rash:   · Do not scratch your rash  Bacteria from your fingernails may enter your open sores during scratching and cause an infection  · Use moisturizes or emollients, such as petroleum jelly  These help relieve itching and help prevent bacteria from getting in your sores  If you have a doctor's order for medicated cream, apply that first  Then apply the moisturizer or emollient on top  · Wear cotton socks and gloves  Craig Ledesma keeps your rash dry, which helps with itching  Gloves and socks help your medicated cream work better      · Ask your healthcare provider how often you should wash your hands  You may need to wash them less often while they heal  Do not use hand  with ethyl alcohol  · Ask your healthcare provider if you need allergy testing  Allergy testing may help identify what triggers your eczema  Prevent the return of your rash:   · Identify and avoid possible triggers  · Avoid sweating more than normal      · Find ways to handle stress  · Decrease the amount of metal salts in your diet  Avoid onions, tomatoes, beans and beer  Ask your healthcare provider what other foods you should avoid  · Do not smoke  If you smoke, it is never too late to quit  Ask for information if you need help quitting  Follow up with your healthcare provider as directed:  Write down your questions so you remember to ask them during your visits  © 2017 [facility] Information is for End User's use only and may not be sold, redistributed or otherwise used for commercial purposes  All illustrations and images included in CareNotes® are the copyrighted property of A D A M [employer] Inc  or [employer]  The above information is an  only  It is not intended as medical advice for individual conditions or treatments  Talk to your doctor, nurse or pharmacist before following any medical regimen to see if it is safe and effective for you

## 2020-07-20 ENCOUNTER — TELEPHONE (OUTPATIENT)
Dept: FAMILY MEDICINE CLINIC | Facility: CLINIC | Age: 20
End: 2020-07-20

## 2020-07-20 NOTE — TELEPHONE ENCOUNTER
SIGNATURES NEEDED PRIOR AUTH for desoximetasone (TOPICORT) 0 25 % cream FORM RECEIVED VIA FAX  WILL BE PLACED IN YOUR BIN AT ASSIGNED DELIVERY TIMES

## 2020-07-24 ENCOUNTER — TELEPHONE (OUTPATIENT)
Dept: FAMILY MEDICINE CLINIC | Facility: CLINIC | Age: 20
End: 2020-07-24

## 2020-07-24 DIAGNOSIS — R21 RASH OF HANDS: Primary | ICD-10-CM

## 2020-08-20 RX ORDER — BETAMETHASONE DIPROPIONATE 0.5 MG/G
CREAM TOPICAL 2 TIMES DAILY
Qty: 30 G | Refills: 0 | Status: SHIPPED | OUTPATIENT
Start: 2020-08-20

## 2020-09-09 ENCOUNTER — TELEMEDICINE (OUTPATIENT)
Dept: FAMILY MEDICINE CLINIC | Facility: CLINIC | Age: 20
End: 2020-09-09

## 2020-09-09 DIAGNOSIS — R10.9 ABDOMINAL PAIN, UNSPECIFIED ABDOMINAL LOCATION: Primary | ICD-10-CM

## 2020-09-09 PROCEDURE — G2012 BRIEF CHECK IN BY MD/QHP: HCPCS | Performed by: FAMILY MEDICINE

## 2020-09-09 RX ORDER — FAMOTIDINE 20 MG/1
20 TABLET, FILM COATED ORAL 2 TIMES DAILY
Qty: 60 TABLET | Refills: 0 | Status: SHIPPED | OUTPATIENT
Start: 2020-09-09

## 2020-09-09 NOTE — PROGRESS NOTES
Virtual Brief Visit    Assessment/Plan:    Problem List Items Addressed This Visit        Other    Abdominal pain - Primary     -Symptoms of abdominal discomfort that started on Thursday morning, she was able to tolerate PO intake of rice, meat   -On Monday, developed soft diarrhea and vomited x1  Tuesday had 1 episode of soft diarrhea   -has been feeling better since then, and tolerating PO intake without difficulty   -No fever, headaches, SOB, sore throat, chest pain or recent exposure to sick contacts or patients with known exposure to [place]  -Symptoms are more likely suggestive of gastrointestinal etiology such as: viral vs gastroenteritis vs GERD  -Will order a trial of Pepcid 20 mg BID and encourage fluid hydration as well as avoidance of food irritants such as: sauce, spicy food, fried food  -If symptoms persists or worsen, call back for further evaluation  Patient verbalized understanding  Relevant Medications    famotidine (PEPCID) 20 mg tablet                Reason for visit is   Chief Complaint   Patient presents with    Virtual Brief Visit        Encounter provider Maryjane Amaya MD    Provider located at [address] [place] [id number] [phone]    Recent Visits  No visits were found meeting these conditions  Showing recent visits within past 7 days and meeting all other requirements     Today's Visits  Date Type Provider Dept   09/09/20 Telemedicine Maryjane Amaya MD  [facility] [place]   Showing today's visits and meeting all other requirements     Future Appointments  No visits were found meeting these conditions  Showing future appointments within next 150 days and meeting all other requirements        After connecting through telephone, the patient was identified by name and date of birth  Cecile Mcdonald was informed that this is a telemedicine visit and that the visit is being conducted through telephone  My office door was closed  No one else was in the room  She acknowledged consent and understanding of privacy and security of the platform  The patient has agreed to participate and understands she can discontinue the visit at any time  Patient is aware this is a billable service  It was my intent to perform this visit via video technology but the patient was not able to do a video connection so the visit was completed via audio telephone only  Sajan Heath is a 21 y o  female who was called today via televisit for evaluation due to abdominal pain  Patient indicates to have noticed symptoms of abdominal discomfort since past Thursday, was able to tolerate PO intake without difficulty, eating rice and meat  On Monday, she had an episode of soft diarrhea and vomited x1  On Tuesday, had 1 episode of soft diarrhea as well  Denies fever, headache, SOB, chest pain, sore throat, cough, recent exposure to sick contacts or been exposed to person with COVID  Past Medical History:   Diagnosis Date    Allergic rhinitis     Anemia     Anxiety     Depression     no meds    Medical history non-contributory     Migraines        Past Surgical History:   Procedure Laterality Date    NO PAST SURGERIES      CA REDUCTION OF LARGE BREAST Bilateral 2/24/2020    Procedure: BREAST REDUCTION;  Surgeon: Rick Tripathi MD;  Location: [address] [place] [address];  Service: Plastics       Current Outpatient Medications   Medication Sig Dispense Refill    betamethasone dipropionate (DIPROSONE) 0 05 % cream Apply topically 2 (two) times a day 30 g 0    cetirizine (ZyrTEC) 10 mg tablet Take 10 mg by mouth daily      desoximetasone (TOPICORT) 0 25 % cream Apply topically 2 (two) times a day 30 g 0    famotidine (PEPCID) 20 mg tablet Take 1 tablet (20 mg total) by mouth 2 (two) times a day 60 tablet 0     No current facility-administered medications for this visit           Allergies   Allergen Reactions    Pollen Extract        Review of Systems   Constitutional: Negative for appetite change and fever  Eyes: Negative for visual disturbance  Respiratory: Negative for cough, shortness of breath and wheezing  Cardiovascular: Negative for chest pain, palpitations and leg swelling  Gastrointestinal: Positive for abdominal pain, diarrhea and vomiting  Negative for constipation and nausea  Neurological: Negative for headaches  Psychiatric/Behavioral: The patient is not nervous/anxious  There were no vitals filed for this visit  I spent 15 minutes directly with the patient during this visit    Maksim Pal acknowledges that she has consented to an online visit or consultation  She understands that the online visit is based solely on information provided by her, and that, in the absence of a face-to-face physical evaluation by the physician, the diagnosis she receives is both limited and provisional in terms of accuracy and completeness  This is not intended to replace a full medical face-to-face evaluation by the physician  Koby Or understands and accepts these terms

## 2020-09-09 NOTE — ASSESSMENT & PLAN NOTE
-Symptoms of abdominal discomfort that started on Thursday morning, she was able to tolerate PO intake of rice, meat   -On Monday, developed soft diarrhea and vomited x1  Tuesday had 1 episode of soft diarrhea   -has been feeling better since then, and tolerating PO intake without difficulty   -No fever, headaches, SOB, sore throat, chest pain or recent exposure to sick contacts or patients with known exposure to [facility]  -Symptoms are more likely suggestive of gastrointestinal etiology such as: viral vs gastroenteritis vs GERD  -Will order a trial of Pepcid 20 mg BID and encourage fluid hydration as well as avoidance of food irritants such as: sauce, spicy food, fried food  -If symptoms persists or worsen, call back for further evaluation  Patient verbalized understanding

## 2020-11-16 ENCOUNTER — TELEMEDICINE (OUTPATIENT)
Dept: FAMILY MEDICINE CLINIC | Facility: CLINIC | Age: 20
End: 2020-11-16

## 2020-11-16 DIAGNOSIS — U07.1 COVID-19: Primary | ICD-10-CM

## 2020-11-16 PROCEDURE — G2012 BRIEF CHECK IN BY MD/QHP: HCPCS | Performed by: INTERNAL MEDICINE

## 2021-06-17 ENCOUNTER — OFFICE VISIT (OUTPATIENT)
Dept: FAMILY MEDICINE CLINIC | Facility: CLINIC | Age: 21
End: 2021-06-17

## 2021-06-17 DIAGNOSIS — J30.9 ALLERGIC RHINITIS, UNSPECIFIED SEASONALITY, UNSPECIFIED TRIGGER: ICD-10-CM

## 2021-06-17 DIAGNOSIS — Z13.31 DEPRESSION SCREEN: ICD-10-CM

## 2021-06-17 DIAGNOSIS — H57.89 EYES SWOLLEN: Primary | ICD-10-CM

## 2021-06-17 DIAGNOSIS — R21 RASH OF HANDS: ICD-10-CM

## 2021-06-17 DIAGNOSIS — E66.09 CLASS 2 OBESITY DUE TO EXCESS CALORIES WITHOUT SERIOUS COMORBIDITY WITH BODY MASS INDEX (BMI) OF 35.0 TO 35.9 IN ADULT: ICD-10-CM

## 2021-06-17 PROCEDURE — T1015 CLINIC SERVICE: HCPCS | Performed by: PHYSICIAN ASSISTANT

## 2021-06-17 PROCEDURE — 99213 OFFICE O/P EST LOW 20 MIN: CPT | Performed by: PHYSICIAN ASSISTANT

## 2021-06-17 RX ORDER — FLUTICASONE PROPIONATE 50 MCG
1 SPRAY, SUSPENSION (ML) NASAL DAILY
Qty: 16 G | Refills: 2 | Status: SHIPPED | OUTPATIENT
Start: 2021-06-17

## 2021-06-17 NOTE — PROGRESS NOTES
Assessment/Plan:    Eyes swollen  - Patient started using new contacts and new eyedrops about 2 weeks ago  Advised patient to contact her eye doctor to discuss her symptoms further   - Advised patient to change her makeup brushes, including her mascara  - Advised to apply cool compress as needed to the affected area to decrease swelling  Allergic rhinitis  - Continue zyrtec 10 mg daily  And allergy eyedrops as needed  Will add flonase  Rash of hands  - Symptoms are suggestive of eczema/dyshidrotic eczema  - Triamcinolone 0 1% cream and topicort 0 25% cream were ineffective  Patient has also tried antifungal cream which was ineffective  - Will refer to dermatology per patient's request     Diagnoses and all orders for this visit:    Eyes swollen    Allergic rhinitis, unspecified seasonality, unspecified trigger  -     fluticasone (FLONASE) 50 mcg/act nasal spray; 1 spray into each nostril daily    Rash of hands  -     Ambulatory referral to Dermatology; Future    Depression screen    Class 2 obesity due to excess calories without serious comorbidity with body mass index (BMI) of 35 0 to 35 9 in adult          All of patients questions were answered  Patient understands and agrees with the above plan  Return if symptoms worsen or fail to improve  Candance Gamble, PA-C  06/17/21  [address] [facility] [place]          Subjective:     Patient ID: Ced Biggs  is a 24 y o  female  has a past medical history of Allergic rhinitis, Anemia, Anxiety, Depression, Medical history non-contributory, and Migraines  who presents today in office for same day visit for eye irritation      - Patient is a 24 y o  female who presents today for same day visit for eye irritation     - Also, patient notes she continues to have the rash in between her fingers, mostly of her right hand  Patient notes that was present, but then it went away for a couple months  Patient notes that has now come back    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  Patient notes she washes her hand, but not excessively  Patient notes she has tried 2 different creams but nothing has been helpful  Patient would like to be referred to dermatology  Also, patient notes she suffers from allergies all year round and either will take Allegra or Zyrtec daily and has also been prescribed allergy eyedrops through her eye doctor  Patient notes over the past week or so her eyes have been very irritated and swollen, left eye worse than right eye  Patient denies any pain, redness, warmth, vision changes, double vision, blurry vision  Patient notes about 1 month ago she started wearing new contacts  Patient notes her eyes were also very dry, so she was prescribed eyedrops through her eye doctor  Patient notes she continues to use eye drops, but she continues to have very dry eyes  Patient notes she also has been getting yellow eye discharge "boogers"  Patient notes she is using Cetaphil eye serum moisturizer  Patient notes she has been using a new mass care, but she does not believe that is the cause of her symptoms  Patient notes she does wear eye makeup, but denies any other new makeup  Patient notes she has not changed her makeup brushes in a long time  Patient denies any other new potential allergens  The following portions of the patient's history were reviewed and updated as appropriate: allergies, current medications, past family history, past medical history, past social history, past surgical history and problem list         Review of Systems   Constitutional: Negative for chills and fever  HENT: Positive for congestion  Negative for ear pain, rhinorrhea and sore throat  Eyes: Positive for discharge  Negative for pain, redness, itching and visual disturbance  Swollen eyelids   Respiratory: Negative for cough, chest tightness and shortness of breath  Cardiovascular: Negative for palpitations     Gastrointestinal: Negative for abdominal pain, constipation, diarrhea, nausea and vomiting  Genitourinary: Negative for difficulty urinating and dysuria  Musculoskeletal: Negative for arthralgias  Neurological: Negative for dizziness and headaches  Psychiatric/Behavioral: The patient is not nervous/anxious  BMI Counseling: Body mass index is 35 33 kg/m²  The BMI is above normal  Nutrition recommendations include decreasing portion sizes, encouraging healthy choices of fruits and vegetables, consuming healthier snacks, limiting drinks that contain sugar, moderation in carbohydrate intake, increasing intake of lean protein and reducing intake of cholesterol  Exercise recommendations include moderate physical activity 150 minutes/week  No pharmacotherapy was ordered  Objective:   Vitals:    06/17/21 0905   BP: 114/86   BP Location: Left arm   Patient Position: Sitting   Cuff Size: Adult   Pulse: 61   Resp: 18   Temp: (!) 97 °F (36 1 °C)   TempSrc: Temporal   SpO2: 98%   Weight: 84 8 kg (187 lb)   Height: 5' 1" (1 549 m)         Physical Exam  Vitals and nursing note reviewed  Constitutional:       Appearance: She is well-developed  HENT:      Head: Normocephalic and atraumatic  No right periorbital erythema or left periorbital erythema  Right Ear: Tympanic membrane and external ear normal       Left Ear: Tympanic membrane and external ear normal       Nose: Nose normal       Mouth/Throat:      Pharynx: Uvula midline  Eyes:      General: No visual field deficit  Right eye: No discharge  Left eye: No discharge  Extraocular Movements: Extraocular movements intact  Right eye: Normal extraocular motion  Left eye: Normal extraocular motion  Pupils: Pupils are equal, round, and reactive to light  Comments: Swelling of eyelids bilaterally, left > right  No erythema  Nontender to palpation  Neck:      Thyroid: No thyromegaly     Cardiovascular:      Rate and Rhythm: Normal rate and regular rhythm  Heart sounds: Normal heart sounds  No murmur heard  Pulmonary:      Effort: Pulmonary effort is normal       Breath sounds: Normal breath sounds  No wheezing  Musculoskeletal:      Cervical back: Normal range of motion and neck supple  Skin:     General: Skin is warm and dry  Neurological:      Mental Status: She is alert and oriented to person, place, and time     Psychiatric:         Mood and Affect: Mood normal          Speech: Speech normal          Behavior: Behavior normal            PHQ-9 Depression Screening    PHQ-9:   Frequency of the following problems over the past two weeks:      Little interest or pleasure in doing things: 0 - not at all  Feeling down, depressed, or hopeless: 0 - not at all  PHQ-2 Score: 0

## 2021-06-21 VITALS
HEIGHT: 61 IN | RESPIRATION RATE: 18 BRPM | BODY MASS INDEX: 35.3 KG/M2 | TEMPERATURE: 97 F | HEART RATE: 61 BPM | OXYGEN SATURATION: 98 % | SYSTOLIC BLOOD PRESSURE: 114 MMHG | WEIGHT: 187 LBS | DIASTOLIC BLOOD PRESSURE: 86 MMHG

## 2024-02-21 PROBLEM — Z30.46 ENCOUNTER FOR SURVEILLANCE OF NEXPLANON SUBDERMAL CONTRACEPTIVE: Status: RESOLVED | Noted: 2018-03-07 | Resolved: 2024-02-21

## 2025-01-11 ENCOUNTER — HOSPITAL ENCOUNTER (EMERGENCY)
Facility: HOSPITAL | Age: 25
Discharge: HOME | End: 2025-01-11
Attending: EMERGENCY MEDICINE
Payer: COMMERCIAL

## 2025-01-11 ENCOUNTER — APPOINTMENT (EMERGENCY)
Dept: CT IMAGING | Facility: HOSPITAL | Age: 25
End: 2025-01-11
Payer: COMMERCIAL

## 2025-01-11 VITALS
OXYGEN SATURATION: 98 % | RESPIRATION RATE: 18 BRPM | DIASTOLIC BLOOD PRESSURE: 82 MMHG | HEART RATE: 77 BPM | TEMPERATURE: 98.8 F | BODY MASS INDEX: 37.74 KG/M2 | WEIGHT: 199.74 LBS | SYSTOLIC BLOOD PRESSURE: 125 MMHG

## 2025-01-11 DIAGNOSIS — R04.0 RIGHT-SIDED EPISTAXIS: ICD-10-CM
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
## 2025-01-11 DIAGNOSIS — S02.2XXA CLOSED FRACTURE OF NASAL BONE, INITIAL ENCOUNTER: Primary | ICD-10-CM

## 2025-01-11 LAB
EXT PREGNANCY TEST URINE: NEGATIVE
EXT. CONTROL: NORMAL

## 2025-01-11 PROCEDURE — 99284 EMERGENCY DEPT VISIT MOD MDM: CPT

## 2025-01-11 PROCEDURE — 81025 URINE PREGNANCY TEST: CPT

## 2025-01-11 PROCEDURE — 96372 THER/PROPH/DIAG INJ SC/IM: CPT

## 2025-01-11 PROCEDURE — 70486 CT MAXILLOFACIAL W/O DYE: CPT

## 2025-01-11 RX ORDER — ONDANSETRON 4 MG/1
4 TABLET, ORALLY DISINTEGRATING ORAL ONCE
Status: DISCONTINUED | OUTPATIENT
Start: 2025-01-11 | End: 2025-01-11 | Stop reason: HOSPADM

## 2025-01-11 RX ORDER — FERROUS SULFATE 325(65) MG
325 TABLET ORAL
COMMUNITY

## 2025-01-11 RX ORDER — KETOROLAC TROMETHAMINE 30 MG/ML
15 INJECTION, SOLUTION INTRAMUSCULAR; INTRAVENOUS ONCE
Status: COMPLETED | OUTPATIENT
Start: 2025-01-11 | End: 2025-01-11

## 2025-01-11 RX ORDER — IBUPROFEN 800 MG/1
800 TABLET, FILM COATED ORAL EVERY 8 HOURS PRN
Qty: 21 TABLET | Refills: 0 | Status: SHIPPED | OUTPATIENT
Start: 2025-01-11

## 2025-01-11 RX ORDER — OXYMETAZOLINE HYDROCHLORIDE 0.05 G/100ML
2 SPRAY NASAL ONCE AS NEEDED
Qty: 15 ML | Refills: 0 | Status: SHIPPED | OUTPATIENT
Start: 2025-01-11

## 2025-01-11 RX ADMIN — KETOROLAC TROMETHAMINE 15 MG: 30 INJECTION, SOLUTION INTRAMUSCULAR; INTRAVENOUS at 03:29

## 2025-01-11 NOTE — ED NOTES
Patient seen by the provider with results of testing as well as discharge, follow up and medication instructions reviewed.     Laverne Bejarano RN  01/11/25 [id number]
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
## 2025-01-13 ENCOUNTER — TELEPHONE (OUTPATIENT)
Dept: FAMILY MEDICINE CLINIC | Facility: CLINIC | Age: 25
End: 2025-01-13

## 2025-01-13 NOTE — TELEPHONE ENCOUNTER
NO LONGER A ST[facility] MEDICINE- PATRICIA PATIENT. PLEASE REMOVE FROM PATIENT PANEL.   NEW PCP: [facility]

## 2025-01-27 NOTE — TELEPHONE ENCOUNTER
01/26/25 9:51 PM        The office's request has been received, reviewed, and the patient chart updated. The PCP has successfully been removed with a patient attribution note. This message will now be completed.        Thank you  Sultana Salinas

## 2025-05-21 ENCOUNTER — HOSPITAL ENCOUNTER (EMERGENCY)
Facility: HOSPITAL | Age: 25
Discharge: HOME | End: 2025-05-21
Attending: EMERGENCY MEDICINE

## 2025-05-21 VITALS
DIASTOLIC BLOOD PRESSURE: 92 MMHG | OXYGEN SATURATION: 99 % | BODY MASS INDEX: 40.7 KG/M2 | TEMPERATURE: 98.3 F | RESPIRATION RATE: 20 BRPM | WEIGHT: 215.39 LBS | HEART RATE: 74 BPM | SYSTOLIC BLOOD PRESSURE: 137 MMHG

## 2025-05-21 DIAGNOSIS — S61.213A LACERATION OF LEFT MIDDLE FINGER WITHOUT FOREIGN BODY WITHOUT DAMAGE TO NAIL, INITIAL ENCOUNTER: Primary | ICD-10-CM

## 2025-05-21 PROCEDURE — 12001 RPR S/N/AX/GEN/TRNK 2.5CM/<: CPT

## 2025-05-21 PROCEDURE — 99282 EMERGENCY DEPT VISIT SF MDM: CPT

## 2025-05-21 PROCEDURE — 90715 TDAP VACCINE 7 YRS/> IM: CPT

## 2025-05-21 PROCEDURE — 99284 EMERGENCY DEPT VISIT MOD MDM: CPT

## 2025-05-21 PROCEDURE — 90471 IMMUNIZATION ADMIN: CPT

## 2025-05-21 RX ORDER — LIDOCAINE HYDROCHLORIDE 10 MG/ML
5 INJECTION, SOLUTION EPIDURAL; INFILTRATION; INTRACAUDAL; PERINEURAL ONCE
Status: COMPLETED | OUTPATIENT
Start: 2025-05-21 | End: 2025-05-21

## 2025-05-21 RX ADMIN — TETANUS TOXOID, REDUCED DIPHTHERIA TOXOID AND ACELLULAR PERTUSSIS VACCINE, ADSORBED 0.5 ML: 5; 2.5; 8; 8; 2.5 SUSPENSION INTRAMUSCULAR at 10:45

## 2025-05-21 RX ADMIN — LIDOCAINE HYDROCHLORIDE 5 ML: 10 INJECTION, SOLUTION EPIDURAL; INFILTRATION; INTRACAUDAL at 10:40

## 2025-05-21 NOTE — ED PROVIDER NOTES
"Time reflects when diagnosis was documented in both MDM as applicable and the Disposition within this note       Time User Action Codes Description Comment    5/21/2025 10:38 AM Opal Rothman Add [K45.156A] Laceration of left middle finger without foreign body without damage to nail, initial encounter           ED Disposition       ED Disposition   Discharge    Condition   Stable    Date/Time   Wed May 21, 2025 11:12 AM    Comment   Kalani Novak discharge to home/self care.                   Assessment & Plan       Medical Decision Making  Accidental finger laceration sustained shortly prior to arrival  Neurovascularly intact. Is a superficial laceration. No tendon involvement suspected.  Tetanus last given in 2011, is due today for tetanus. Administered.   4 sutures placed in the wound with good closure, no complications from the procedure.   Discussed wound care, and follow up for suture removal.   =============================  Pt stable at time of discharge, vital signs reviewed, questions answered. Strict ER return precautions provided/discussed and were well understood by patient. Patient's vitals, labs and/or imaging results, diagnosis, and treatment plan were discussed with the patient. All new and/or changed medications were discussed - specifically to include route of administration, how often to take, when to take, and the pharmacy they were sent to. Strict return precautions as well as close follow up with PCP was discussed with the patient and the patient was agreeable to my recommendations.  Patient verbally acknowledged understanding. All labs, imaging were reviewed and used in the medical decision making process (if ordered).     Portions of this chart may have been written with voice recognition software.  Occasional grammatical errors, wrong word or \"sound a like\" substitutions may have occurred due to software limitations.  Please read carefully and use context to recognize where substitutions " have occurred.    Problems Addressed:  Laceration of left middle finger without foreign body without damage to nail, initial encounter: acute illness or injury    Risk  Prescription drug management.             Medications   tetanus-diphtheria-acellular pertussis (BOOSTRIX) IM injection 0.5 mL (0.5 mL Intramuscular Given 5/21/25 1045)   lidocaine (PF) (XYLOCAINE-MPF) 1 % injection 5 mL (5 mL Infiltration Given 5/21/25 1040)       ED Risk Strat Scores                    No data recorded                            History of Present Illness       Chief Complaint   Patient presents with    Finger Laceration     Lacerated L middle finger       Past Medical History:   Diagnosis Date    Allergic rhinitis     Anemia     Anxiety     Depression     no meds    Medical history non-contributory     Migraines       Past Surgical History:   Procedure Laterality Date    NO PAST SURGERIES      SD BREAST REDUCTION Bilateral 2/24/2020    Procedure: BREAST REDUCTION;  Surgeon: Sebastián Naranjo MD;  Location:  MAIN OR;  Service: Plastics      Family History   Problem Relation Age of Onset    Diabetes Father     Cancer Paternal Grandmother       Social History[1]   E-Cigarette/Vaping    E-Cigarette Use Never User       E-Cigarette/Vaping Substances      I have reviewed and agree with the history as documented.     Kalani is a 25 year old right hand dominant female presenting to the ED for finger laceration sustained to her left middle finger between the DIP and PIP shortly prior to arrival. Maintains ability to move and feel the finger. Bleeding controlled.         Review of Systems   Constitutional:  Negative for chills and fever.   Respiratory:  Negative for cough and shortness of breath.    Cardiovascular:  Negative for chest pain and palpitations.   Skin:  Positive for wound.   Neurological:  Negative for light-headedness, numbness and headaches.           Objective       ED Triage Vitals [05/21/25 1026]   Temperature Pulse Blood  Pressure Respirations SpO2 Patient Position - Orthostatic VS   98.3 °F (36.8 °C) 74 137/92 20 99 % Sitting      Temp Source Heart Rate Source BP Location FiO2 (%) Pain Score    Oral Monitor Right arm -- --      Vitals      Date and Time Temp Pulse SpO2 Resp BP Pain Score FACES Pain Rating User   05/21/25 1026 98.3 °F (36.8 °C) 74 99 % 20 137/92 -- --             Physical Exam  Vitals reviewed.   Constitutional:       General: She is not in acute distress.     Appearance: Normal appearance. She is not ill-appearing or toxic-appearing.   HENT:      Head: Normocephalic and atraumatic.     Cardiovascular:      Rate and Rhythm: Normal rate and regular rhythm.      Pulses: Normal pulses.           Radial pulses are 2+ on the right side and 2+ on the left side.   Pulmonary:      Effort: Pulmonary effort is normal. No respiratory distress.     Musculoskeletal:         General: Normal range of motion.      Right wrist: Normal.      Left wrist: Normal.      Right hand: Normal.      Cervical back: Normal range of motion and neck supple. No rigidity or tenderness.      Comments: To the left middle finger on the posterior side between the PIP and DIP there is a superficial 2cm laceration. No foreign body. ROM intact to the finger. No active bleeding.   Lymphadenopathy:      Cervical: No cervical adenopathy.     Skin:     General: Skin is warm and dry.      Capillary Refill: Capillary refill takes less than 2 seconds.     Neurological:      General: No focal deficit present.      Mental Status: She is alert.     Psychiatric:         Mood and Affect: Mood normal.         Behavior: Behavior normal.         Results Reviewed       None            No orders to display         Universal Protocol:  procedure performed by consultantConsent: Verbal consent obtained  Risks and benefits: risks, benefits and alternatives were discussed  Consent given by: patient  Patient understanding: patient states understanding of the procedure being  performed  Patient consent: the patient's understanding of the procedure matches consent given  Procedure consent: procedure consent matches procedure scheduled  Relevant documents: relevant documents present and verified  Test results: test results available and properly labeled  Site marked: the operative site was marked  Radiology Images displayed and confirmed. If images not available, report reviewed: imaging studies available  Required items: required blood products, implants, devices, and special equipment available  Patient identity confirmed: verbally with patient and arm band  Laceration repair    Date/Time: 5/21/2025 10:45 AM    Performed by: Opal Rothman PA-C  Authorized by: Opal Rothman PA-C  Body area: upper extremity  Location details: left long finger  Laceration length: 2 cm  Foreign bodies: no foreign bodies  Tendon involvement: none  Nerve involvement: none  Anesthesia: digital block    Anesthesia:  Local Anesthetic: lidocaine 1% without epinephrine  Anesthetic total: 4 mL    Wound Dehiscence:  Superficial Wound Dehiscence: simple closure      Procedure Details:  Preparation: Patient was prepped and draped in the usual sterile fashion.  Irrigation solution: saline  Irrigation method: syringe and tap  Amount of cleaning: extensive  Debridement: none  Degree of undermining: none  Skin closure: Ethilon  Number of sutures: 4  Technique: simple  Approximation: close  Approximation difficulty: simple  Dressing: 4x4 sterile gauze and gauze roll  Patient tolerance: patient tolerated the procedure well with no immediate complications          ED Medication and Procedure Management   Prior to Admission Medications   Prescriptions Last Dose Informant Patient Reported? Taking?   betamethasone dipropionate (DIPROSONE) 0.05 % cream   No No   Sig: Apply topically 2 (two) times a day   Patient not taking: Reported on 6/17/2021   cetirizine (ZyrTEC) 10 mg tablet   Yes No   Sig: Take 10 mg  by mouth daily   desoximetasone (TOPICORT) 0.25 % cream   No No   Sig: Apply topically 2 (two) times a day   Patient not taking: Reported on 2021   famotidine (PEPCID) 20 mg tablet   No No   Sig: Take 1 tablet (20 mg total) by mouth 2 (two) times a day   Patient not taking: Reported on 2021   ferrous sulfate 325 (65 Fe) mg tablet  Other (Specify) Yes No   Sig: Take 325 mg by mouth daily with breakfast   fluticasone (FLONASE) 50 mcg/act nasal spray   No No   Si spray into each nostril daily   ibuprofen (MOTRIN) 800 mg tablet   No No   Sig: Take 1 tablet (800 mg total) by mouth every 8 (eight) hours as needed for moderate pain   oxymetazoline (AFRIN) 0.05 % nasal spray   No No   Si sprays by Right Nare route once as needed (for a nosebleed) for up to 1 dose      Facility-Administered Medications: None     Discharge Medication List as of 2025 11:12 AM        CONTINUE these medications which have NOT CHANGED    Details   cetirizine (ZyrTEC) 10 mg tablet Take 10 mg by mouth daily, Historical Med      ferrous sulfate 325 (65 Fe) mg tablet Take 325 mg by mouth daily with breakfast, Historical Med      fluticasone (FLONASE) 50 mcg/act nasal spray 1 spray into each nostril daily, Starting Thu 2021, Normal           STOP taking these medications       betamethasone dipropionate (DIPROSONE) 0.05 % cream Comments:   Reason for Stopping:         desoximetasone (TOPICORT) 0.25 % cream Comments:   Reason for Stopping:         famotidine (PEPCID) 20 mg tablet Comments:   Reason for Stopping:         ibuprofen (MOTRIN) 800 mg tablet Comments:   Reason for Stopping:         oxymetazoline (AFRIN) 0.05 % nasal spray Comments:   Reason for Stopping:             No discharge procedures on file.  ED SEPSIS DOCUMENTATION   Time reflects when diagnosis was documented in both MDM as applicable and the Disposition within this note       Time User Action Codes Description Comment    2025 10:38 AM Stephan  Opal Add [S61.213A] Laceration of left middle finger without foreign body without damage to nail, initial encounter                    [1]   Social History  Tobacco Use    Smoking status: Never    Smokeless tobacco: Never   Vaping Use    Vaping status: Never Used   Substance Use Topics    Alcohol use: Yes     Comment: socially    Drug use: No        Opal Rothman PA-C  05/21/25 1129

## 2025-05-21 NOTE — DISCHARGE INSTRUCTIONS
Wash the wound with warm soapy water only, do not use alcohol or hydrogen peroxide. Keep the wound clean and dry. Pat dry the wound after washing hands.     Return to your PCP, urgent care, or the ED for suture removal in 7-10 days.

## (undated) DEVICE — SYRINGE 10ML LL CONTROL TOP

## (undated) DEVICE — SMOKE EVACUATION TUBING WITH 7/8" HOSE TO HOSE CONNECTOR: Brand: BUFFALO FILTER

## (undated) DEVICE — DRAIN CHANNEL RND FULL FLUTED 19FR W/TROCAR

## (undated) DEVICE — INTENDED FOR TISSUE SEPARATION, AND OTHER PROCEDURES THAT REQUIRE A SHARP SURGICAL BLADE TO PUNCTURE OR CUT.: Brand: BARD-PARKER SAFETY BLADES SIZE 11, STERILE

## (undated) DEVICE — CANNULA 4MM TRI-PORT III 22CM 10 MM PORT

## (undated) DEVICE — INTENDED FOR TISSUE SEPARATION, AND OTHER PROCEDURES THAT REQUIRE A SHARP SURGICAL BLADE TO PUNCTURE OR CUT.: Brand: BARD-PARKER ® SAFETYLOCK CARBON RIB-BACK BLADES

## (undated) DEVICE — STERILE POLYISOPRENE POWDER-FREE SURGICAL GLOVES: Brand: PROTEXIS

## (undated) DEVICE — SYRINGE 3ML LL

## (undated) DEVICE — GAUZE SPONGES,16 PLY: Brand: CURITY

## (undated) DEVICE — SKIN MARKER DUAL TIP WITH RULER CAP, FLEXIBLE RULER AND LABELS: Brand: DEVON

## (undated) DEVICE — NEEDLE FILTER 5 MICR 19G X 1.5IN

## (undated) DEVICE — FILTER ABSOLUTE BIOFILTER 0.2MICRON F/LIPOSUCTION

## (undated) DEVICE — 3M™ STERI-STRIP™ REINFORCED ADHESIVE SKIN CLOSURES, R1547, 1/2 IN X 4 IN (12 MM X 100 MM), 6 STRIPS/ENVELOPE: Brand: 3M™ STERI-STRIP™

## (undated) DEVICE — SCD SEQUENTIAL COMPRESSION COMFORT SLEEVE MEDIUM KNEE LENGTH: Brand: KENDALL SCD

## (undated) DEVICE — 4-PORT MANIFOLD: Brand: NEPTUNE 2

## (undated) DEVICE — DRESSING XEROFORM 5 X 9

## (undated) DEVICE — CANNISTER WASTE IMPLOSION PROOF

## (undated) DEVICE — SPONGE LAP 18 X 18 IN STRL RFD

## (undated) DEVICE — TUBING SUCTION 5MM X 12 FT

## (undated) DEVICE — PACK UNIVERSAL DRAPES SUB-Q ICD

## (undated) DEVICE — SUT VICRYL 2-0 J459H

## (undated) DEVICE — SUT VICRYL 4-0 PS-2 18 IN J496G

## (undated) DEVICE — JACKSON-PRATT 100CC BULB RESERVOIR: Brand: CARDINAL HEALTH

## (undated) DEVICE — NEEDLE 25G X 1 1/2

## (undated) DEVICE — TUBING ASPIRATION LIPOSUCTION SET 12FT

## (undated) DEVICE — PENCIL SMOKE EVAC TELESCOPING W/TUBING

## (undated) DEVICE — STERILE POLYISOPRENE POWDER-FREE SURGICAL GLOVES WITH EMOLLIENT COATING: Brand: PROTEXIS

## (undated) DEVICE — DRAPE SHEET THREE QUARTER

## (undated) DEVICE — KERLIX BANDAGE ROLL: Brand: KERLIX

## (undated) DEVICE — SUT PROLENE 3-0 PC-5 18 IN 8632G

## (undated) DEVICE — STANDARD SURGICAL GOWN, L: Brand: CONVERTORS

## (undated) DEVICE — 1820 FOAM BLOCK NEEDLE COUNTER: Brand: DEVON

## (undated) DEVICE — TUBING INFILTRATION 9FT

## (undated) DEVICE — WET SKIN PREP TRAY: Brand: MEDLINE INDUSTRIES, INC.